# Patient Record
Sex: FEMALE | Race: WHITE | HISPANIC OR LATINO | Employment: OTHER | URBAN - METROPOLITAN AREA
[De-identification: names, ages, dates, MRNs, and addresses within clinical notes are randomized per-mention and may not be internally consistent; named-entity substitution may affect disease eponyms.]

---

## 2023-06-06 ENCOUNTER — APPOINTMENT (EMERGENCY)
Dept: CT IMAGING | Facility: HOSPITAL | Age: 38
DRG: 871 | End: 2023-06-06
Payer: MEDICARE

## 2023-06-06 ENCOUNTER — APPOINTMENT (EMERGENCY)
Dept: RADIOLOGY | Facility: HOSPITAL | Age: 38
DRG: 871 | End: 2023-06-06
Payer: MEDICARE

## 2023-06-06 ENCOUNTER — HOSPITAL ENCOUNTER (INPATIENT)
Facility: HOSPITAL | Age: 38
LOS: 2 days | Discharge: HOME | DRG: 871 | End: 2023-06-08
Attending: EMERGENCY MEDICINE | Admitting: INTERNAL MEDICINE
Payer: MEDICARE

## 2023-06-06 DIAGNOSIS — J18.9 PNEUMONIA: ICD-10-CM

## 2023-06-06 DIAGNOSIS — J96.01 ACUTE HYPOXEMIC RESPIRATORY FAILURE (HCC): Primary | ICD-10-CM

## 2023-06-06 DIAGNOSIS — J18.9 PNEUMONITIS: ICD-10-CM

## 2023-06-06 DIAGNOSIS — Z94.84 H/O STEM CELL TRANSPLANT (HCC): ICD-10-CM

## 2023-06-06 DIAGNOSIS — D84.9 IMMUNOSUPPRESSION (HCC): ICD-10-CM

## 2023-06-06 LAB
ALBUMIN SERPL BCP-MCNC: 4.1 G/DL (ref 3.5–5)
ALP SERPL-CCNC: 71 U/L (ref 34–104)
ALT SERPL W P-5'-P-CCNC: 26 U/L (ref 7–52)
ANION GAP SERPL CALCULATED.3IONS-SCNC: 9 MMOL/L (ref 4–13)
AST SERPL W P-5'-P-CCNC: 46 U/L (ref 13–39)
ATRIAL RATE: 113 BPM
BASE EX.OXY STD BLDV CALC-SCNC: 90.6 % (ref 60–80)
BASE EXCESS BLDV CALC-SCNC: 0.4 MMOL/L
BASOPHILS # BLD AUTO: 0.01 THOUSANDS/ÂΜL (ref 0–0.1)
BASOPHILS NFR BLD AUTO: 0 % (ref 0–1)
BILIRUB DIRECT SERPL-MCNC: 0.15 MG/DL (ref 0–0.2)
BILIRUB SERPL-MCNC: 0.34 MG/DL (ref 0.2–1)
BNP SERPL-MCNC: 116 PG/ML (ref 0–100)
BUN SERPL-MCNC: 21 MG/DL (ref 5–25)
CALCIUM SERPL-MCNC: 8.9 MG/DL (ref 8.4–10.2)
CARDIAC TROPONIN I PNL SERPL HS: 4 NG/L
CHLORIDE SERPL-SCNC: 104 MMOL/L (ref 96–108)
CO2 SERPL-SCNC: 24 MMOL/L (ref 21–32)
CREAT SERPL-MCNC: 0.76 MG/DL (ref 0.6–1.3)
D DIMER PPP FEU-MCNC: 1.79 UG/ML FEU
EOSINOPHIL # BLD AUTO: 0 THOUSAND/ÂΜL (ref 0–0.61)
EOSINOPHIL NFR BLD AUTO: 0 % (ref 0–6)
ERYTHROCYTE [DISTWIDTH] IN BLOOD BY AUTOMATED COUNT: 13.1 % (ref 11.6–15.1)
GFR SERPL CREATININE-BSD FRML MDRD: 99 ML/MIN/1.73SQ M
GLUCOSE SERPL-MCNC: 154 MG/DL (ref 65–140)
HCG SERPL QL: NEGATIVE
HCO3 BLDV-SCNC: 23.7 MMOL/L (ref 24–30)
HCT VFR BLD AUTO: 30.6 % (ref 34.8–46.1)
HGB BLD-MCNC: 10.4 G/DL (ref 11.5–15.4)
IMM GRANULOCYTES # BLD AUTO: 0.04 THOUSAND/UL (ref 0–0.2)
IMM GRANULOCYTES NFR BLD AUTO: 0 % (ref 0–2)
LACTATE SERPL-SCNC: 1.5 MMOL/L (ref 0.5–2)
LYMPHOCYTES # BLD AUTO: 0.49 THOUSANDS/ÂΜL (ref 0.6–4.47)
LYMPHOCYTES NFR BLD AUTO: 5 % (ref 14–44)
MCH RBC QN AUTO: 35.1 PG (ref 26.8–34.3)
MCHC RBC AUTO-ENTMCNC: 34 G/DL (ref 31.4–37.4)
MCV RBC AUTO: 103 FL (ref 82–98)
MONOCYTES # BLD AUTO: 0.48 THOUSAND/ÂΜL (ref 0.17–1.22)
MONOCYTES NFR BLD AUTO: 5 % (ref 4–12)
NEUTROPHILS # BLD AUTO: 9.14 THOUSANDS/ÂΜL (ref 1.85–7.62)
NEUTS SEG NFR BLD AUTO: 90 % (ref 43–75)
NRBC BLD AUTO-RTO: 0 /100 WBCS
O2 CT BLDV-SCNC: 14.9 ML/DL
P AXIS: 66 DEGREES
PCO2 BLDV: 33.6 MM HG (ref 42–50)
PH BLDV: 7.47 [PH] (ref 7.3–7.4)
PLATELET # BLD AUTO: 245 THOUSANDS/UL (ref 149–390)
PMV BLD AUTO: 9.9 FL (ref 8.9–12.7)
PO2 BLDV: 60.5 MM HG (ref 35–45)
POTASSIUM SERPL-SCNC: 4.1 MMOL/L (ref 3.5–5.3)
PR INTERVAL: 146 MS
PROCALCITONIN SERPL-MCNC: 0.58 NG/ML
PROT SERPL-MCNC: 8.3 G/DL (ref 6.4–8.4)
QRS AXIS: -39 DEGREES
QRSD INTERVAL: 88 MS
QT INTERVAL: 342 MS
QTC INTERVAL: 469 MS
RBC # BLD AUTO: 2.96 MILLION/UL (ref 3.81–5.12)
SARS-COV-2 RNA RESP QL NAA+PROBE: POSITIVE
SODIUM SERPL-SCNC: 137 MMOL/L (ref 135–147)
T WAVE AXIS: 60 DEGREES
VENTRICULAR RATE: 113 BPM
WBC # BLD AUTO: 10.16 THOUSAND/UL (ref 4.31–10.16)

## 2023-06-06 PROCEDURE — 94640 AIRWAY INHALATION TREATMENT: CPT

## 2023-06-06 PROCEDURE — 96365 THER/PROPH/DIAG IV INF INIT: CPT

## 2023-06-06 PROCEDURE — 84484 ASSAY OF TROPONIN QUANT: CPT | Performed by: EMERGENCY MEDICINE

## 2023-06-06 PROCEDURE — 83605 ASSAY OF LACTIC ACID: CPT | Performed by: EMERGENCY MEDICINE

## 2023-06-06 PROCEDURE — 85025 COMPLETE CBC W/AUTO DIFF WBC: CPT | Performed by: EMERGENCY MEDICINE

## 2023-06-06 PROCEDURE — 80076 HEPATIC FUNCTION PANEL: CPT | Performed by: EMERGENCY MEDICINE

## 2023-06-06 PROCEDURE — 94760 N-INVAS EAR/PLS OXIMETRY 1: CPT

## 2023-06-06 PROCEDURE — 84145 PROCALCITONIN (PCT): CPT | Performed by: EMERGENCY MEDICINE

## 2023-06-06 PROCEDURE — G1004 CDSM NDSC: HCPCS

## 2023-06-06 PROCEDURE — 84703 CHORIONIC GONADOTROPIN ASSAY: CPT | Performed by: PHYSICIAN ASSISTANT

## 2023-06-06 PROCEDURE — 83036 HEMOGLOBIN GLYCOSYLATED A1C: CPT | Performed by: PHYSICIAN ASSISTANT

## 2023-06-06 PROCEDURE — 93005 ELECTROCARDIOGRAM TRACING: CPT

## 2023-06-06 PROCEDURE — 99285 EMERGENCY DEPT VISIT HI MDM: CPT

## 2023-06-06 PROCEDURE — 87040 BLOOD CULTURE FOR BACTERIA: CPT | Performed by: EMERGENCY MEDICINE

## 2023-06-06 PROCEDURE — 96375 TX/PRO/DX INJ NEW DRUG ADDON: CPT

## 2023-06-06 PROCEDURE — 83880 ASSAY OF NATRIURETIC PEPTIDE: CPT | Performed by: EMERGENCY MEDICINE

## 2023-06-06 PROCEDURE — 87449 NOS EACH ORGANISM AG IA: CPT | Performed by: EMERGENCY MEDICINE

## 2023-06-06 PROCEDURE — 82805 BLOOD GASES W/O2 SATURATION: CPT | Performed by: EMERGENCY MEDICINE

## 2023-06-06 PROCEDURE — 93010 ELECTROCARDIOGRAM REPORT: CPT | Performed by: INTERNAL MEDICINE

## 2023-06-06 PROCEDURE — 87635 SARS-COV-2 COVID-19 AMP PRB: CPT | Performed by: PHYSICIAN ASSISTANT

## 2023-06-06 PROCEDURE — 80197 ASSAY OF TACROLIMUS: CPT | Performed by: EMERGENCY MEDICINE

## 2023-06-06 PROCEDURE — 85379 FIBRIN DEGRADATION QUANT: CPT | Performed by: EMERGENCY MEDICINE

## 2023-06-06 PROCEDURE — 71275 CT ANGIOGRAPHY CHEST: CPT

## 2023-06-06 PROCEDURE — 36415 COLL VENOUS BLD VENIPUNCTURE: CPT | Performed by: EMERGENCY MEDICINE

## 2023-06-06 PROCEDURE — 80048 BASIC METABOLIC PNL TOTAL CA: CPT | Performed by: EMERGENCY MEDICINE

## 2023-06-06 RX ORDER — CEFTRIAXONE 1 G/50ML
1000 INJECTION, SOLUTION INTRAVENOUS ONCE
Status: COMPLETED | OUTPATIENT
Start: 2023-06-06 | End: 2023-06-06

## 2023-06-06 RX ORDER — MAGNESIUM SULFATE HEPTAHYDRATE 40 MG/ML
2 INJECTION, SOLUTION INTRAVENOUS ONCE
Status: COMPLETED | OUTPATIENT
Start: 2023-06-06 | End: 2023-06-06

## 2023-06-06 RX ORDER — FLUTICASONE PROPIONATE AND SALMETEROL 250; 50 UG/1; UG/1
1 POWDER RESPIRATORY (INHALATION) 2 TIMES DAILY
COMMUNITY

## 2023-06-06 RX ORDER — TACROLIMUS 1 MG/1
1 CAPSULE ORAL EVERY OTHER DAY
COMMUNITY

## 2023-06-06 RX ORDER — ACYCLOVIR 800 MG/1
800 TABLET ORAL 2 TIMES DAILY
COMMUNITY

## 2023-06-06 RX ORDER — ALBUTEROL SULFATE 90 UG/1
2 AEROSOL, METERED RESPIRATORY (INHALATION) EVERY 6 HOURS PRN
COMMUNITY

## 2023-06-06 RX ORDER — METHYLPREDNISOLONE SODIUM SUCCINATE 125 MG/2ML
125 INJECTION, POWDER, LYOPHILIZED, FOR SOLUTION INTRAMUSCULAR; INTRAVENOUS ONCE
Status: COMPLETED | OUTPATIENT
Start: 2023-06-06 | End: 2023-06-06

## 2023-06-06 RX ORDER — SODIUM CHLORIDE FOR INHALATION 0.9 %
12 VIAL, NEBULIZER (ML) INHALATION ONCE
Status: COMPLETED | OUTPATIENT
Start: 2023-06-06 | End: 2023-06-06

## 2023-06-06 RX ORDER — ACETAMINOPHEN 325 MG/1
975 TABLET ORAL ONCE
Status: COMPLETED | OUTPATIENT
Start: 2023-06-06 | End: 2023-06-06

## 2023-06-06 RX ADMIN — IOHEXOL 100 ML: 350 INJECTION, SOLUTION INTRAVENOUS at 19:57

## 2023-06-06 RX ADMIN — ALBUTEROL SULFATE 10 MG: 2.5 SOLUTION RESPIRATORY (INHALATION) at 17:45

## 2023-06-06 RX ADMIN — METHYLPREDNISOLONE SODIUM SUCCINATE 125 MG: 125 INJECTION, POWDER, FOR SOLUTION INTRAMUSCULAR; INTRAVENOUS at 17:50

## 2023-06-06 RX ADMIN — VANCOMYCIN HYDROCHLORIDE 1250 MG: 5 INJECTION, POWDER, LYOPHILIZED, FOR SOLUTION INTRAVENOUS at 22:52

## 2023-06-06 RX ADMIN — Medication 12 ML: at 17:45

## 2023-06-06 RX ADMIN — ACETAMINOPHEN 975 MG: 325 TABLET, FILM COATED ORAL at 22:19

## 2023-06-06 RX ADMIN — IPRATROPIUM BROMIDE 1.5 MG: 0.5 SOLUTION RESPIRATORY (INHALATION) at 17:45

## 2023-06-06 RX ADMIN — MAGNESIUM SULFATE HEPTAHYDRATE 2 G: 40 INJECTION, SOLUTION INTRAVENOUS at 17:49

## 2023-06-06 RX ADMIN — CEFTRIAXONE 1000 MG: 1 INJECTION, SOLUTION INTRAVENOUS at 22:20

## 2023-06-06 NOTE — ED PROVIDER NOTES
History  Chief Complaint   Patient presents with   • Shortness of Breath     Patient sent in by urgent care for x-ray  Patient reports shortness of breath for a few days     HPI    None       Past Medical History:   Diagnosis Date   • Asthma    • Lymphoma ([facility] [address] )        History reviewed  No pertinent surgical history  History reviewed  No pertinent family history  I have reviewed and agree with the history as documented  E-Cigarette/Vaping   • E-Cigarette Use Never User      E-Cigarette/Vaping Substances     Social History     Tobacco Use   • Smoking status: Never   • Smokeless tobacco: Never   Vaping Use   • Vaping Use: Never used   Substance Use Topics   • Alcohol use: Not Currently   • Drug use: Not Currently       Review of Systems    Physical Exam  Physical Exam  Vitals and nursing note reviewed  Constitutional:       General: She is in acute distress  Appearance: She is well-developed  HENT:      Head: Normocephalic and atraumatic  Eyes:      Conjunctiva/sclera: Conjunctivae normal       Pupils: Pupils are equal, round, and reactive to light  Neck:      Trachea: No tracheal deviation  Cardiovascular:      Rate and Rhythm: Regular rhythm  Tachycardia present  Heart sounds: Normal heart sounds  Pulmonary:      Effort: Tachypnea and respiratory distress (moderate) present  Breath sounds: Decreased air movement (mild, diffuse) present  Wheezing present  Comments: Mild conversational dyspnea  Coughing with deep breaths  Low normal O2 sats- 91-93% on RA  Abdominal:      General: There is no distension  Palpations: Abdomen is soft  Tenderness: There is no abdominal tenderness  Musculoskeletal:      Cervical back: Normal range of motion  Right lower leg: No edema  Left lower leg: No edema  Skin:     General: Skin is warm and dry  Neurological:      Mental Status: She is alert and oriented to person, place, and time  GCS: GCS eye subscore is 4   GCS verbal subscore is 5  GCS motor subscore is 6  Psychiatric:         Behavior: Behavior normal          Vital Signs  ED Triage Vitals [06/06/23 1636]   Temperature Pulse Respirations Blood Pressure SpO2   98 8 °F (37 1 °C) (!) 123 (!) 26 137/66 93 %      Temp Source Heart Rate Source Patient Position - Orthostatic VS BP Location FiO2 (%)   Oral Monitor Sitting Left arm --      Pain Score       --           Vitals:    06/06/23 1636   BP: 137/66   Pulse: (!) 123   Patient Position - Orthostatic VS: Sitting         Visual Acuity      ED Medications  Medications   vancomycin (VANCOCIN) 1,250 mg in sodium chloride 0 9 % 250 mL IVPB (1,250 mg Intravenous New Bag 6/6/23 2252)   albuterol inhalation solution 10 mg (10 mg Nebulization Given 6/6/23 1745)   ipratropium (ATROVENT) 0 02 % inhalation solution 1 5 mg (1 5 mg Nebulization Given 6/6/23 1745)   sodium chloride 0 9 % inhalation solution 12 mL (12 mL Nebulization Given 6/6/23 1745)   methylPREDNISolone sodium succinate (Solu-MEDROL) injection 125 mg (125 mg Intravenous Given 6/6/23 1750)   magnesium sulfate 2 g/50 mL IVPB (premix) 2 g (0 g Intravenous Stopped 6/6/23 1919)   iohexol (OMNIPAQUE) 350 MG/ML injection (SINGLE-DOSE) 100 mL (100 mL Intravenous Given 6/6/23 1957)   cefTRIAXone (ROCEPHIN) IVPB (premix in dextrose) 1,000 mg 50 mL (0 mg Intravenous Stopped 6/6/23 2250)   acetaminophen (TYLENOL) tablet 975 mg (975 mg Oral Given 6/6/23 2219)       Diagnostic Studies  Results Reviewed     Procedure Component Value Units Date/Time    Hepatic function panel [[id number]]  (Abnormal) Collected: 06/06/23 1740    Lab Status: Final result Specimen: Blood from Arm, Left Updated: 06/06/23 2228     Total Bilirubin 0 34 mg/dL      Bilirubin, Direct 0 15 mg/dL      Alkaline Phosphatase 71 U/L      AST 46 U/L      ALT 26 U/L      Total Protein 8 3 g/dL      Albumin 4 1 g/dL     Tacrolimus level [[id number]] Collected: 06/06/23 2212    Lab Status:  In process Specimen: Blood from Arm, Right Updated: 06/06/23 2219    Blood culture #2 [[id number]] Collected: 06/06/23 2000    Lab Status: In process Specimen: Blood from Arm, Left Updated: 06/06/23 2219    Blood culture #1 [[id number]] Collected: 06/06/23 2212    Lab Status: In process Specimen: Blood from Arm, Right Updated: 06/06/23 2219    Legionella antigen, urine [[id number]]     Lab Status: No result Specimen: Urine     Strep Pneumoniae, Urine [[id number]]     Lab Status: No result Specimen: Urine     CBC and differential [[id number]]  (Abnormal) Collected: 06/06/23 1740    Lab Status: Final result Specimen: Blood from Arm, Left Updated: 06/06/23 1834     WBC 10 16 Thousand/uL      RBC 2 96 Million/uL      Hemoglobin 10 4 g/dL      Hematocrit 30 6 %       fL      MCH 35 1 pg      MCHC 34 0 g/dL      RDW 13 1 %      MPV 9 9 fL      Platelets 183 Thousands/uL      nRBC 0 /100 WBCs      Neutrophils Relative 90 %      Immat GRANS % 0 %      Lymphocytes Relative 5 %      Monocytes Relative 5 %      Eosinophils Relative 0 %      Basophils Relative 0 %      Neutrophils Absolute 9 14 Thousands/µL      Immature Grans Absolute 0 04 Thousand/uL      Lymphocytes Absolute 0 49 Thousands/µL      Monocytes Absolute 0 48 Thousand/µL      Eosinophils Absolute 0 00 Thousand/µL      Basophils Absolute 0 01 Thousands/µL     Narrative: This is an appended report  These results have been appended to a previously verified report      D-Dimer [[id number]]  (Abnormal) Collected: 06/06/23 1740    Lab Status: Final result Specimen: Blood from Arm, Left Updated: 06/06/23 1823     D-Dimer, Quant 1 79 ug/ml FEU     HS Troponin 0hr (reflex protocol) [[id number]]  (Normal) Collected: 06/06/23 1740    Lab Status: Final result Specimen: Blood from Arm, Left Updated: 06/06/23 1821     hs TnI 0hr 4 ng/L     Procalcitonin [[id number]]  (Abnormal) Collected: 06/06/23 1740    Lab Status: Final result Specimen: Blood from Arm, Left Updated: 06/06/23 1820     Procalcitonin 0 58 ng/ml     B-Type Natriuretic Peptide(BNP) [[id number]]  (Abnormal) Collected: 06/06/23 1740    Lab Status: Final result Specimen: Blood from Arm, Left Updated: 06/06/23 1818      pg/mL     Lactic acid, plasma (w/reflex if result > 2 0) [[id number]]  (Normal) Collected: 06/06/23 1740    Lab Status: Final result Specimen: Blood from Arm, Left Updated: 06/06/23 1808     LACTIC ACID 1 5 mmol/L     Narrative:      Result may be elevated if tourniquet was used during collection  Basic metabolic panel [[id number]]  (Abnormal) Collected: 06/06/23 1740    Lab Status: Final result Specimen: Blood from Arm, Left Updated: 06/06/23 1808     Sodium 137 mmol/L      Potassium 4 1 mmol/L      Chloride 104 mmol/L      CO2 24 mmol/L      ANION GAP 9 mmol/L      BUN 21 mg/dL      Creatinine 0 76 mg/dL      Glucose 154 mg/dL      Calcium 8 9 mg/dL      eGFR 99 ml/min/1 73sq m     Narrative:      [facility] guidelines for Chronic Kidney Disease (CKD):   •  Stage 1 with normal or high GFR (GFR > 90 mL/min/1 73 square meters)  •  Stage 2 Mild CKD (GFR = 60-89 mL/min/1 73 square meters)  •  Stage 3A Moderate CKD (GFR = 45-59 mL/min/1 73 square meters)  •  Stage 3B Moderate CKD (GFR = 30-44 mL/min/1 73 square meters)  •  Stage 4 Severe CKD (GFR = 15-29 mL/min/1 73 square meters)  •  Stage 5 End Stage CKD (GFR <15 mL/min/1 73 square meters)  Note: GFR calculation is accurate only with a steady state creatinine    Blood gas, venous [[id number]]  (Abnormal) Collected: 06/06/23 1740    Lab Status: Final result Specimen: Blood from Arm, Left Updated: 06/06/23 1751     pH, Saw 7 466     pCO2, Saw 33 6 mm Hg      pO2, Saw 60 5 mm Hg      HCO3, Saw 23 7 mmol/L      Base Excess, Saw 0 4 mmol/L      O2 Content, Saw 14 9 ml/dL      O2 HGB, VENOUS 90 6 %                  CTA ED chest PE study   Final Result by Vtialy Burks MD (06/06 2053)      Patchy infiltrates throughout the lungs        No pulmonary embolism or aortic dissection  No effusion or pneumothorax  Recommend follow-up to resolution  The study was marked in [facility] for immediate notification        Workstation performed: [id number]                    Procedures  ECG 12 Lead Documentation Only    Date/Time: 6/6/2023 5:31 PM    Performed by: Kay Palacios MD  Authorized by: Kay Palacios MD    Indications / Diagnosis:  SOB  ECG reviewed by me, the ED Provider: yes    Patient location:  ED  Previous ECG:     Previous ECG:  Unavailable  Interpretation:     Interpretation: non-specific    Rate:     ECG rate:  113    ECG rate assessment: tachycardic    Rhythm:     Rhythm: sinus tachycardia    Ectopy:     Ectopy: none    QRS:     QRS axis:  Left    QRS intervals:  Normal  Conduction:     Conduction: normal    ST segments:     ST segments:  Normal  T waves:     T waves: normal      CriticalCare Time    Date/Time: 6/6/2023 11:32 PM    Performed by: Kay Palacios MD  Authorized by: Kay Palacios MD    Critical care provider statement:     Critical care time (minutes):  46    Critical care time was exclusive of:  Separately billable procedures and treating other patients and teaching time    Critical care was necessary to treat or prevent imminent or life-threatening deterioration of the following conditions:  Respiratory failure, sepsis and circulatory failure    Critical care was time spent personally by me on the following activities:  Obtaining history from patient or surrogate, development of treatment plan with patient or surrogate, discussions with consultants, evaluation of patient's response to treatment, examination of patient, re-evaluation of patient's condition, ordering and performing treatments and interventions, ordering and review of laboratory studies and ordering and review of radiographic studies    I assumed direction of critical care for this patient from another provider in "my specialty: no               ED Course                                             Medical Decision Making  This is a 29-year-old female who presents here today for evaluation of cough and shortness of breath  She has a history of Hodgkin's lymphoma, status post both allogenic and autologous stem cell transplants, previously on \"immunosuppressive agents,\" which she said caused a chronic pneumonitis  She was on oxygen for about a year but symptoms improved  She is maintained on chronic daily medications and albuterol as needed  She says at baseline O2 sats usually run 94 to 96% at the lower end, but up to 98%  She said she was doing well up until about a week ago  She was starting to develop some cough, and saw her doctor in [facility], who ordered testing for COVID, flu, and \"mycoplasma,\" which all came back negative  She says last week they came up to [place] for vacation, and since 6/2 has been having worsening cough and shortness of breath, not improving with her albuterol  She says cold weather has triggered symptoms before, and says temperature is significantly colder here than it has been in [facility]   She went to urgent care yesterday, was told she needed an x-ray but was unable to get it done  O2 sats at that visit were 91%, with a temperature of 102  She says she has not felt like she has had a fever at home  She continues to have symptoms today, prompting her to come to the ER for evaluation  Review of systems, otherwise negative unless stated as above    She is well-appearing, no moderate respiratory distress  She has mild wheezing with mildly decreased breath sounds, and frequent cough with deep breaths  She is tachycardic to the 120s, with borderline hypoxia between 91 and 93%  She is tachypneic with mild conversational dyspnea  She has no lower extremity edema  Exam is otherwise unremarkable    This could be exacerbation of her underlying chronic lung disease given weather " changes, exacerbated the past 2 days, especially today, due to significant smoke in the air  However, with degree of tachycardia and hypoxia and recent travel from [facility] there is concern for possible PE contributing to this  She tested negative for viral causes about a week ago, however with fever yesterday may be component of bacterial pneumonia or other nonspecific viral illness  We will check chest x-ray and lab work to evaluate, and treat her symptoms  The patient has an elevated D-dimer of 1 79  BNP is minimally elevated at 116  She is mildly anemic to 10 4  D-dimer is elevated to 1 79  Procalcitonin is mildly elevated to 0 58, however she has a normal lactic acid and no leukocytosis  Given elevation of D-dimer, we will get CT PE study instead of chest x-ray  I did update patient on this plan, and she expresses understanding  The patient does endorse improvement after nebulizers, however has become hypoxic down to the high 80s on room air  She is less tachypneic, but still mildly so  She does still have tachycardia  She has mild diffuse wheezing but improved air movement and is no longer coughing with deep breaths  CT scan shows no PE  She has patchy infiltrates throughout the lungs  This could be related to acute bacterial pneumonia, atypical bacterial or viral pneumonia given immunosuppression  We will start her on broad-spectrum antibiotics  Given hypoxia she will be admitted for further evaluation management of her symptoms  I updated patient and  on findings and plan of care, and they expressed understanding  Acute hypoxemic respiratory failure ([facility] [address] ): acute illness or injury  H/O stem cell transplant [facility]): acute illness or injury  Immunosuppression [facility]): acute illness or injury  Pneumonia: acute illness or injury  Pneumonitis: acute illness or injury  Amount and/or Complexity of Data Reviewed  Labs: ordered  Radiology: ordered  Risk  OTC drugs    Prescription drug management  Decision regarding hospitalization  Disposition  Final diagnoses:   [facility]   Pneumonia   [facility]   Immunosuppression ([facility] )   Pneumonitis - chronic, due to antineoplastic/immunosuppressive agents     Time reflects when diagnosis was documented in both MDM as applicable and the Disposition within this note     Time User Action Codes Description Comment    6/6/2023  9:53 PM Akins-TesYoon agudelo Add [J96 01] Acute hypoxemic respiratory failure ([facility] )     6/6/2023  9:56 PM Akins-TesdaniieroYoon Faroese Add [J18 9] Pneumonia     6/6/2023  9:59 PM Akins-TesdaniieroYoon Add [Z94 84] H/O stem cell transplant ([facility] )     6/6/2023 10:16 PM Akins-TesdaniieroYoon Add [D84 9] Immunosuppression ([facility] )     6/6/2023 10:18 PM Mable-Yoon Cruz Add [J18 9] Pneumonitis     6/6/2023 10:18 PM AkinsYoon Jeffrey Modify [J18 9] Pneumonitis chronic, due to antineoplastic/immunosuppressive agents      ED Disposition     ED Disposition   Admit    Condition   Stable    Date/Time   Tue Jun 6, 2023 11:18 PM    Comment   Case was discussed with Carl Monroe and the patient's admission status was agreed to be Admission Status: inpatient status to the service of Dr Augie Bansal  Follow-up Information    None         Patient's Medications    No medications on file       No discharge procedures on file      PDMP Review     None          ED Provider  Electronically Signed by           Williams Espinoza MD  06/06/23 8217       Williams Espinoza MD  06/06/23 9617       Williams Espinoza MD  06/06/23 3977

## 2023-06-06 NOTE — Clinical Note
Case was discussed with Ariel Childers and the patient's admission status was agreed to be Admission Status: inpatient status to the service of

## 2023-06-07 PROBLEM — U07.1 ACUTE RESPIRATORY FAILURE DUE TO COVID-19 (HCC): Status: ACTIVE | Noted: 2023-06-07

## 2023-06-07 PROBLEM — J96.00 ACUTE RESPIRATORY FAILURE DUE TO COVID-19 (HCC): Status: ACTIVE | Noted: 2023-06-07

## 2023-06-07 PROBLEM — A41.9 SEPSIS (HCC): Status: ACTIVE | Noted: 2023-06-07

## 2023-06-07 PROBLEM — Z94.84 HISTORY OF STEM CELL TRANSPLANT (HCC): Chronic | Status: ACTIVE | Noted: 2023-06-07

## 2023-06-07 PROBLEM — J18.9 PNEUMONITIS: Chronic | Status: ACTIVE | Noted: 2023-06-07

## 2023-06-07 PROBLEM — C85.90 LYMPHOMA (HCC): Chronic | Status: ACTIVE | Noted: 2023-06-07

## 2023-06-07 LAB
CARDIAC TROPONIN I PNL SERPL HS: 7 NG/L (ref 8–18)
CK SERPL-CCNC: 515 U/L (ref 26–192)
CRP SERPL QL: 167.3 MG/L
ERYTHROCYTE [DISTWIDTH] IN BLOOD BY AUTOMATED COUNT: 13.3 % (ref 11.6–15.1)
EST. AVERAGE GLUCOSE BLD GHB EST-MCNC: 108 MG/DL
HBA1C MFR BLD: 5.4 %
HCT VFR BLD AUTO: 28.9 % (ref 34.8–46.1)
HEPARIN CF II AG PPP IA-MCNC: <0.1 IU/ML
HGB BLD-MCNC: 9.8 G/DL (ref 11.5–15.4)
L PNEUMO1 AG UR QL IA.RAPID: NEGATIVE
MCH RBC QN AUTO: 35.9 PG (ref 26.8–34.3)
MCHC RBC AUTO-ENTMCNC: 33.9 G/DL (ref 31.4–37.4)
MCV RBC AUTO: 106 FL (ref 82–98)
PLATELET # BLD AUTO: 237 THOUSANDS/UL (ref 149–390)
PMV BLD AUTO: 9.6 FL (ref 8.9–12.7)
RBC # BLD AUTO: 2.73 MILLION/UL (ref 3.81–5.12)
S PNEUM AG UR QL: NEGATIVE
TACROLIMUS BLD-MCNC: <1 NG/ML (ref 3–15)
WBC # BLD AUTO: 8.16 THOUSAND/UL (ref 4.31–10.16)

## 2023-06-07 PROCEDURE — 82550 ASSAY OF CK (CPK): CPT | Performed by: PHYSICIAN ASSISTANT

## 2023-06-07 PROCEDURE — 99223 1ST HOSP IP/OBS HIGH 75: CPT | Performed by: INTERNAL MEDICINE

## 2023-06-07 PROCEDURE — 84484 ASSAY OF TROPONIN QUANT: CPT | Performed by: PHYSICIAN ASSISTANT

## 2023-06-07 PROCEDURE — 85027 COMPLETE CBC AUTOMATED: CPT | Performed by: PHYSICIAN ASSISTANT

## 2023-06-07 PROCEDURE — 86140 C-REACTIVE PROTEIN: CPT | Performed by: PHYSICIAN ASSISTANT

## 2023-06-07 PROCEDURE — 36415 COLL VENOUS BLD VENIPUNCTURE: CPT | Performed by: PHYSICIAN ASSISTANT

## 2023-06-07 PROCEDURE — 85520 HEPARIN ASSAY: CPT | Performed by: PHYSICIAN ASSISTANT

## 2023-06-07 RX ORDER — ACYCLOVIR 800 MG/1
800 TABLET ORAL 2 TIMES DAILY
Status: DISCONTINUED | OUTPATIENT
Start: 2023-06-07 | End: 2023-06-08 | Stop reason: HOSPADM

## 2023-06-07 RX ORDER — ALBUTEROL SULFATE 90 UG/1
2 AEROSOL, METERED RESPIRATORY (INHALATION) EVERY 4 HOURS PRN
Status: DISCONTINUED | OUTPATIENT
Start: 2023-06-07 | End: 2023-06-08 | Stop reason: HOSPADM

## 2023-06-07 RX ORDER — PANTOT AC/GLYCERIN/PETROLATUM
CREAM (GRAM) TOPICAL 3 TIMES DAILY
COMMUNITY

## 2023-06-07 RX ORDER — TACROLIMUS 1 MG/1
1 CAPSULE ORAL EVERY OTHER DAY
Status: DISCONTINUED | OUTPATIENT
Start: 2023-06-08 | End: 2023-06-07

## 2023-06-07 RX ORDER — ESTRADIOL 1 MG/1
1 TABLET ORAL
COMMUNITY

## 2023-06-07 RX ORDER — MAGNESIUM HYDROXIDE/ALUMINUM HYDROXICE/SIMETHICONE 120; 1200; 1200 MG/30ML; MG/30ML; MG/30ML
30 SUSPENSION ORAL EVERY 6 HOURS PRN
Status: DISCONTINUED | OUTPATIENT
Start: 2023-06-07 | End: 2023-06-08 | Stop reason: HOSPADM

## 2023-06-07 RX ORDER — TEMAZEPAM 7.5 MG/1
7.5 CAPSULE ORAL
Status: DISCONTINUED | OUTPATIENT
Start: 2023-06-07 | End: 2023-06-08 | Stop reason: HOSPADM

## 2023-06-07 RX ORDER — SODIUM CHLORIDE 9 MG/ML
50 INJECTION, SOLUTION INTRAVENOUS CONTINUOUS
Status: DISPENSED | OUTPATIENT
Start: 2023-06-07 | End: 2023-06-07

## 2023-06-07 RX ORDER — BENZONATATE 100 MG/1
100 CAPSULE ORAL 3 TIMES DAILY PRN
COMMUNITY

## 2023-06-07 RX ORDER — ESTAZOLAM 1 MG
1 TABLET ORAL
COMMUNITY

## 2023-06-07 RX ORDER — ESTRADIOL 1 MG/1
1 TABLET ORAL
Status: DISCONTINUED | OUTPATIENT
Start: 2023-06-07 | End: 2023-06-08 | Stop reason: HOSPADM

## 2023-06-07 RX ORDER — TACROLIMUS 0.5 MG/1
1 CAPSULE ORAL EVERY OTHER DAY
Status: DISCONTINUED | OUTPATIENT
Start: 2023-06-07 | End: 2023-06-08 | Stop reason: HOSPADM

## 2023-06-07 RX ORDER — CEFTRIAXONE 1 G/50ML
1000 INJECTION, SOLUTION INTRAVENOUS EVERY 24 HOURS
Status: DISCONTINUED | OUTPATIENT
Start: 2023-06-07 | End: 2023-06-08 | Stop reason: HOSPADM

## 2023-06-07 RX ORDER — PANTOT AC/GLYCERIN/PETROLATUM
CREAM (GRAM) TOPICAL 3 TIMES DAILY
Status: DISCONTINUED | OUTPATIENT
Start: 2023-06-07 | End: 2023-06-08 | Stop reason: HOSPADM

## 2023-06-07 RX ORDER — ACETAMINOPHEN 325 MG/1
650 TABLET ORAL EVERY 6 HOURS PRN
Status: DISCONTINUED | OUTPATIENT
Start: 2023-06-07 | End: 2023-06-08 | Stop reason: HOSPADM

## 2023-06-07 RX ORDER — PROGESTERONE 100 MG/1
1 CAPSULE ORAL
COMMUNITY

## 2023-06-07 RX ORDER — DEXAMETHASONE SODIUM PHOSPHATE 10 MG/ML
6 INJECTION, SOLUTION INTRAMUSCULAR; INTRAVENOUS EVERY 24 HOURS
Status: DISCONTINUED | OUTPATIENT
Start: 2023-06-07 | End: 2023-06-08 | Stop reason: HOSPADM

## 2023-06-07 RX ORDER — ENOXAPARIN SODIUM 100 MG/ML
1 INJECTION SUBCUTANEOUS EVERY 12 HOURS SCHEDULED
Status: DISCONTINUED | OUTPATIENT
Start: 2023-06-07 | End: 2023-06-08 | Stop reason: HOSPADM

## 2023-06-07 RX ORDER — PROGESTERONE 100 MG/1
1 CAPSULE ORAL
Status: DISCONTINUED | OUTPATIENT
Start: 2023-06-07 | End: 2023-06-08 | Stop reason: HOSPADM

## 2023-06-07 RX ORDER — FLUTICASONE FUROATE AND VILANTEROL 200; 25 UG/1; UG/1
1 POWDER RESPIRATORY (INHALATION)
Status: DISCONTINUED | OUTPATIENT
Start: 2023-06-07 | End: 2023-06-08 | Stop reason: HOSPADM

## 2023-06-07 RX ADMIN — PROGESTERONE 1 CAPSULE: 100 CAPSULE ORAL at 22:12

## 2023-06-07 RX ADMIN — TACROLIMUS 1 MG: 1 CAPSULE ORAL at 11:20

## 2023-06-07 RX ADMIN — HYDROCORTISONE: 25 CREAM TOPICAL at 21:25

## 2023-06-07 RX ADMIN — ENOXAPARIN SODIUM 90 MG: 100 INJECTION SUBCUTANEOUS at 21:24

## 2023-06-07 RX ADMIN — ENOXAPARIN SODIUM 90 MG: 100 INJECTION SUBCUTANEOUS at 09:41

## 2023-06-07 RX ADMIN — TEMAZEPAM 7.5 MG: 7.5 CAPSULE ORAL at 02:11

## 2023-06-07 RX ADMIN — ACYCLOVIR 800 MG: 800 TABLET ORAL at 09:49

## 2023-06-07 RX ADMIN — CEFTRIAXONE 1000 MG: 1 INJECTION, SOLUTION INTRAVENOUS at 21:25

## 2023-06-07 RX ADMIN — Medication: at 21:25

## 2023-06-07 RX ADMIN — ESTRADIOL 1 MG: 1 TABLET ORAL at 22:12

## 2023-06-07 RX ADMIN — ACYCLOVIR 800 MG: 800 TABLET ORAL at 17:49

## 2023-06-07 RX ADMIN — REMDESIVIR 200 MG: 100 INJECTION, POWDER, LYOPHILIZED, FOR SOLUTION INTRAVENOUS at 02:15

## 2023-06-07 RX ADMIN — SODIUM CHLORIDE 50 ML/HR: 0.9 INJECTION, SOLUTION INTRAVENOUS at 02:45

## 2023-06-07 RX ADMIN — DEXAMETHASONE SODIUM PHOSPHATE 6 MG: 10 INJECTION, SOLUTION INTRAMUSCULAR; INTRAVENOUS at 09:42

## 2023-06-07 RX ADMIN — FLUTICASONE FUROATE AND VILANTEROL TRIFENATATE 1 PUFF: 200; 25 POWDER RESPIRATORY (INHALATION) at 11:20

## 2023-06-07 NOTE — ASSESSMENT & PLAN NOTE
· Meeting criteria due to being febrile, tachycardic, tachypneic in setting of COVID-19 pneumonia  · Blood culture x2 pending, lactic acid WNL  · We will continue gentle IV fluid hydration for now over the next 12 hours  · Due to immunosuppressed status will continue IV ceftriaxone 1 g daily for now

## 2023-06-07 NOTE — ED NOTES
Patient ambulatory to bathroom to get washed up with RN   Patient ambulated with steady gait and no SOB     Gregory Jordan RN  06/07/23 4052

## 2023-06-07 NOTE — ASSESSMENT & PLAN NOTE
· Follows outpatient with oncology in [facility] where patient is from  · Continue home tacrolimus 1 mg every other day  · Patient requesting for oncologist to be updated, number in chart

## 2023-06-07 NOTE — ED NOTES
This nurse Remy texted the provider asking to change the order for the patients Prograf to today  Pt reports that she took it on the 5th and it is needed every other day         Maciej Kay RN  06/07/23 7923

## 2023-06-07 NOTE — H&P
[address] [place] [address]  H&P  Name: Chio Richter 45 y o  female I MRN: [id number]  Unit/Bed#: ED 27 I Date of Admission: 6/6/2023   Date of Service: 6/7/2023 I Hospital Day: 1      Assessment/Plan   * Acute respiratory failure due to COVID-19 [facility])  Assessment & Plan  · Presenting in 80% SPO2 on room air, now currently saturating 94 to 96% on 3 L nasal cannula, wean as tolerated  · COVID test positive  · CTA chest negative for PE but revealing patchy infiltrate throughout lungs most likely related to COVID-pneumonia  · We will place on mild COVID protocol  · Start IV dexamethasone 6 mg daily  · Start IV remdesivir daily  · D-dimer elevated 1 79, will start subcutaneous Lovenox 1 mg/kg every 12 hours    Sepsis ([facility] )  Assessment & Plan  · Meeting criteria due to being febrile, tachycardic, tachypneic in setting of COVID-19 pneumonia  · Blood culture x2 pending, lactic acid WNL  · We will continue gentle IV fluid hydration for now over the next 12 hours  · Due to immunosuppressed status will continue IV ceftriaxone 1 g daily for now    Lymphoma [facility])  Assessment & Plan  · [address] [facility] outpatient with oncology in [facility] where patient is from  · Continue home tacrolimus 1 mg every other day  · Patient requesting for oncologist to be updated, number in chart         VTE Pharmacologic Prophylaxis: VTE Score: 5 High Risk (Score >/= 5) - Pharmacological DVT Prophylaxis Ordered: enoxaparin (Lovenox)  Sequential Compression Devices Ordered  Code Status: Level 1 - Full Code   Discussion with family: pt  Anticipated Length of Stay: Patient will be admitted on an inpatient basis with an anticipated length of stay of greater than 2 midnights secondary to see above      Total Time Spent on Date of Encounter in care of patient: 75 minutes This time was spent on one or more of the following: performing physical exam; counseling and coordination of care; obtaining or reviewing history; documenting in the medical record; reviewing/ordering tests, medications or procedures; communicating with other healthcare professionals and discussing with patient's family/caregivers  Chief Complaint:    Chief Complaint   Patient presents with   • Shortness of Breath     Patient sent in by urgent care for x-ray  Patient reports shortness of breath for a few days       History of Present Illness:  Christiana Boland is a 45 y o  female with a PMH of lymphoma who presents with complaint of sudden onset shortness of breath and generally not feeling well about 2 days ago  Patient reports she is currently on vacation here from [facility] and reports prior to coming here she also did not feel well and had a fever, but went to her oncologist and doctor who reported that she was okay to come here on vacation  She reports her shortness of breath is much worse today but currently is subsiding with the help of supplemental oxygen  She admits she has been having fevers  Denies chest pain, abdominal pain     Patient reports she is fully vaccinated against COVID and had her initial 2 vaccines plus a booster  Review of Systems:  Review of Systems   Constitutional: Positive for fatigue and fever  Negative for activity change  Respiratory: Positive for shortness of breath  Cardiovascular: Negative for chest pain  Gastrointestinal: Negative for abdominal pain  Neurological: Negative for headaches  Past Medical and Surgical History:   Past Medical History:   Diagnosis Date   • Asthma    • Lymphoma ([facility] )        History reviewed  No pertinent surgical history  Meds/Allergies:  Prior to Admission medications    Medication Sig Start Date End Date Taking?  Authorizing Provider   albuterol (PROVENTIL HFA,VENTOLIN HFA) 90 mcg/act inhaler Inhale 2 puffs every 6 (six) hours as needed for wheezing   Yes Historical Provider, MD   estazolam (PROSOM) 1 MG tablet Take 1 mg by mouth daily at bedtime   Yes Historical Provider, MD "  Fluticasone-Salmeterol (Advair) 250-50 mcg/dose inhaler Inhale 1 puff 2 (two) times a day Rinse mouth after use  Yes Historical Provider, MD   tacrolimus (PROGRAF) 1 mg capsule Take 1 mg by mouth every other day   Yes Historical Provider, MD   acyclovir (ZOVIRAX) 800 mg tablet Take 800 mg by mouth 2 (two) times a day    Historical Provider, MD     I have reviewed her medications per review of chart and PDMP  Allergies: Allergies   Allergen Reactions   • Sulfa Antibiotics Rash       Social History:  Marital Status: /Civil Union     Patient Pre-hospital Living Situation: Home  Patient Pre-hospital Level of Mobility: walks  Patient Pre-hospital Diet Restrictions: n/a  Substance Use History:   Social History     Substance and Sexual Activity   Alcohol Use Not Currently     Social History     Tobacco Use   Smoking Status Never   Smokeless Tobacco Never     Social History     Substance and Sexual Activity   Drug Use Not Currently       Family History:  History reviewed  No pertinent family history  Physical Exam:     Vitals:   Blood Pressure: 117/73 (06/06/23 2329)  Pulse: (!) 113 (06/06/23 2329)  Temperature: 98 4 °F (36 9 °C) (06/06/23 2316)  Temp Source: Oral (06/06/23 2316)  Respirations: 20 (06/06/23 2329)  Height: 5' 5\" (165 1 cm) (06/06/23 2215)  Weight - Scale: 87 1 kg (192 lb) (06/06/23 2215)  SpO2: 95 % (06/06/23 2329)    Physical Exam  Vitals and nursing note reviewed  Constitutional:       General: She is not in acute distress  Appearance: Normal appearance  She is not diaphoretic  HENT:      Head: Normocephalic  Cardiovascular:      Rate and Rhythm: Regular rhythm  Tachycardia present  Heart sounds: Normal heart sounds  Pulmonary:      Effort: Pulmonary effort is normal  No respiratory distress  Breath sounds: Normal breath sounds  No stridor  No wheezing, rhonchi or rales  Abdominal:      General: Abdomen is flat   Bowel sounds are normal       Palpations: Abdomen " is soft  Musculoskeletal:         General: No tenderness  Right lower leg: No edema  Left lower leg: No edema  Skin:     General: Skin is warm and dry  Neurological:      General: No focal deficit present  Mental Status: She is alert and oriented to person, place, and time  Psychiatric:         Mood and Affect: Mood normal          Behavior: Behavior normal          Thought Content: Thought content normal           Additional Data:     Lab Results:  Results from last 7 days   Lab Units 06/06/23  1740   EOS PCT % 0   HEMATOCRIT % 30 6*   HEMOGLOBIN g/dL 10 4*   LYMPHS PCT % 5*   MONOS PCT % 5   NEUTROS PCT % 90*   PLATELETS Thousands/uL 245   WBC Thousand/uL 10 16     Results from last 7 days   Lab Units 06/06/23  1740   ANION GAP mmol/L 9   ALBUMIN g/dL 4 1   ALK PHOS U/L 71   ALT U/L 26   AST U/L 46*   BUN mg/dL 21   CALCIUM mg/dL 8 9   CHLORIDE mmol/L 104   CO2 mmol/L 24   CREATININE mg/dL 0 76   GLUCOSE RANDOM mg/dL 154*   POTASSIUM mmol/L 4 1   SODIUM mmol/L 137   TOTAL BILIRUBIN mg/dL 0 34                 Results from last 7 days   Lab Units 06/06/23  1740   LACTIC ACID mmol/L 1 5   PROCALCITONIN ng/ml 0 58*       Lines/Drains:  Invasive Devices     Peripheral Intravenous Line  Duration           Peripheral IV 06/06/23 Left Antecubital <1 day                    Imaging: Reviewed radiology reports from this admission including: chest CT scan  CTA ED chest PE study   Final Result by Cole Vela MD (06/06 2053)      Patchy infiltrates throughout the lungs  No pulmonary embolism or aortic dissection  No effusion or pneumothorax  Recommend follow-up to resolution  The study was marked in [facility] for immediate notification  Workstation performed: [id number]             EKG and Other Studies Reviewed on Admission:   · EKG: Sinus tachycardia  ** Please Note: This note has been constructed using a voice recognition system   **

## 2023-06-07 NOTE — ASSESSMENT & PLAN NOTE
· Presenting in 80% SPO2 on room air, now currently saturating 94 to 96% on 3 L nasal cannula, wean as tolerated  · COVID test positive  · CTA chest negative for PE but revealing patchy infiltrate throughout lungs most likely related to COVID-pneumonia  · We will place on mild COVID protocol  · Start IV dexamethasone 6 mg daily  · Start IV remdesivir daily  · D-dimer elevated 1 79, will start subcutaneous Lovenox 1 mg/kg every 12 hours

## 2023-06-07 NOTE — ED NOTES
SLIM provider at bedside, per slim ok to trial patient off of O2   Patient stated to provider they felt good after washing up and denied any sob afterwards     Rosy Walters, RN  06/07/23 3491

## 2023-06-08 VITALS
HEART RATE: 92 BPM | RESPIRATION RATE: 17 BRPM | WEIGHT: 192 LBS | OXYGEN SATURATION: 93 % | HEIGHT: 65 IN | TEMPERATURE: 98 F | BODY MASS INDEX: 31.99 KG/M2 | SYSTOLIC BLOOD PRESSURE: 130 MMHG | DIASTOLIC BLOOD PRESSURE: 85 MMHG

## 2023-06-08 LAB
ALBUMIN SERPL BCP-MCNC: 3.6 G/DL (ref 3.5–5)
ALP SERPL-CCNC: 59 U/L (ref 34–104)
ALT SERPL W P-5'-P-CCNC: 20 U/L (ref 7–52)
ANION GAP SERPL CALCULATED.3IONS-SCNC: 7 MMOL/L (ref 4–13)
AST SERPL W P-5'-P-CCNC: 26 U/L (ref 13–39)
BASOPHILS # BLD AUTO: 0.01 THOUSANDS/ÂΜL (ref 0–0.1)
BASOPHILS NFR BLD AUTO: 0 % (ref 0–1)
BILIRUB SERPL-MCNC: 0.5 MG/DL (ref 0.2–1)
BUN SERPL-MCNC: 18 MG/DL (ref 5–25)
CALCIUM SERPL-MCNC: 8.5 MG/DL (ref 8.4–10.2)
CHLORIDE SERPL-SCNC: 106 MMOL/L (ref 96–108)
CO2 SERPL-SCNC: 25 MMOL/L (ref 21–32)
CREAT SERPL-MCNC: 0.58 MG/DL (ref 0.6–1.3)
EOSINOPHIL # BLD AUTO: 0 THOUSAND/ÂΜL (ref 0–0.61)
EOSINOPHIL NFR BLD AUTO: 0 % (ref 0–6)
ERYTHROCYTE [DISTWIDTH] IN BLOOD BY AUTOMATED COUNT: 13.4 % (ref 11.6–15.1)
GFR SERPL CREATININE-BSD FRML MDRD: 117 ML/MIN/1.73SQ M
GLUCOSE SERPL-MCNC: 91 MG/DL (ref 65–140)
HCT VFR BLD AUTO: 29.7 % (ref 34.8–46.1)
HGB BLD-MCNC: 9.9 G/DL (ref 11.5–15.4)
IMM GRANULOCYTES # BLD AUTO: 0.04 THOUSAND/UL (ref 0–0.2)
IMM GRANULOCYTES NFR BLD AUTO: 1 % (ref 0–2)
LYMPHOCYTES # BLD AUTO: 1.74 THOUSANDS/ÂΜL (ref 0.6–4.47)
LYMPHOCYTES NFR BLD AUTO: 23 % (ref 14–44)
MCH RBC QN AUTO: 35 PG (ref 26.8–34.3)
MCHC RBC AUTO-ENTMCNC: 33.3 G/DL (ref 31.4–37.4)
MCV RBC AUTO: 105 FL (ref 82–98)
MONOCYTES # BLD AUTO: 0.66 THOUSAND/ÂΜL (ref 0.17–1.22)
MONOCYTES NFR BLD AUTO: 9 % (ref 4–12)
NEUTROPHILS # BLD AUTO: 5.21 THOUSANDS/ÂΜL (ref 1.85–7.62)
NEUTS SEG NFR BLD AUTO: 67 % (ref 43–75)
NRBC BLD AUTO-RTO: 0 /100 WBCS
PLATELET # BLD AUTO: 250 THOUSANDS/UL (ref 149–390)
PMV BLD AUTO: 9.8 FL (ref 8.9–12.7)
POTASSIUM SERPL-SCNC: 4.2 MMOL/L (ref 3.5–5.3)
PROT SERPL-MCNC: 7.4 G/DL (ref 6.4–8.4)
RBC # BLD AUTO: 2.83 MILLION/UL (ref 3.81–5.12)
SODIUM SERPL-SCNC: 138 MMOL/L (ref 135–147)
WBC # BLD AUTO: 7.66 THOUSAND/UL (ref 4.31–10.16)

## 2023-06-08 PROCEDURE — 80053 COMPREHEN METABOLIC PANEL: CPT | Performed by: INTERNAL MEDICINE

## 2023-06-08 PROCEDURE — 85025 COMPLETE CBC W/AUTO DIFF WBC: CPT | Performed by: INTERNAL MEDICINE

## 2023-06-08 PROCEDURE — XW033E5 INTRODUCTION OF REMDESIVIR ANTI-INFECTIVE INTO PERIPHERAL VEIN, PERCUTANEOUS APPROACH, NEW TECHNOLOGY GROUP 5: ICD-10-PCS | Performed by: INTERNAL MEDICINE

## 2023-06-08 PROCEDURE — 99239 HOSP IP/OBS DSCHRG MGMT >30: CPT | Performed by: INTERNAL MEDICINE

## 2023-06-08 RX ORDER — AMOXICILLIN AND CLAVULANATE POTASSIUM 875; 125 MG/1; MG/1
1 TABLET, FILM COATED ORAL EVERY 12 HOURS SCHEDULED
Qty: 6 TABLET | Refills: 0 | Status: SHIPPED | OUTPATIENT
Start: 2023-06-08 | End: 2023-06-11

## 2023-06-08 RX ADMIN — REMDESIVIR 100 MG: 100 INJECTION, POWDER, LYOPHILIZED, FOR SOLUTION INTRAVENOUS at 02:35

## 2023-06-08 RX ADMIN — ENOXAPARIN SODIUM 90 MG: 100 INJECTION SUBCUTANEOUS at 09:42

## 2023-06-08 RX ADMIN — ACYCLOVIR 800 MG: 800 TABLET ORAL at 09:53

## 2023-06-08 RX ADMIN — FLUTICASONE FUROATE AND VILANTEROL TRIFENATATE 1 PUFF: 200; 25 POWDER RESPIRATORY (INHALATION) at 09:42

## 2023-06-08 RX ADMIN — DEXAMETHASONE SODIUM PHOSPHATE 6 MG: 10 INJECTION, SOLUTION INTRAMUSCULAR; INTRAVENOUS at 09:42

## 2023-06-08 NOTE — DISCHARGE SUMMARY
[facility]  Discharge- Magui Norman 1985, 45 y o  female MRN: [id number]  Unit/Bed#: -01 Encounter: [id number]  Primary Care Provider: No primary care provider on file  Date and time admitted to hospital: 6/6/2023  5:14 PM    * Acute respiratory failure due to COVID-19 [facility])  Assessment & Plan  · Patient ambulating through her room and saturating greater than 90% on room air  Patient has been off oxygen for the last 24 hours  · COVID test positive  · CTA chest negative for PE but revealing patchy infiltrate throughout lungs most likely related to COVID-pneumonia  · Patient with prior doses of prednisone which she will continue on discharge  · Augmentin to treat underlying pneumonia for total of 5 days  · Patient received 2 days of IV remdesivir  · Patient to follow-up with primary care provider in [facility]; discussed with patient she should have repeat imaging of chest in 4 to 6 weeks    [facility] ([facility] )  Assessment & Plan  · Meeting criteria due to being febrile, tachycardic, tachypneic in setting of COVID-19 pneumonia  · Blood culture x2 no growth after 24 hours    Lymphoma ([facility] )  Assessment & Plan  · [address] outpatient with oncology in [facility] where patient is from  · Continue home tacrolimus 1 mg every other day  · Continue outpatient follow-up with primary oncologist in [address] [place] [address] Problems     Resolved Problems  Date Reviewed: 6/7/2023   None       Discharging Physician / Practitioner: Author Benja DO  PCP: No primary care provider on file    Admission Date:   Admission Orders (From admission, onward)     Ordered        06/06/23 2218  [facility] [place]  Once                      Discharge Date: 06/08/23    Consultations During Hospital Stay:  · none    Reason for Admission: Shortness of breath    Hospital Course:   Magui Norman is a 45 y o  female patient who originally presented to the hospital on 6/6/2023 due to shortness "of breath  Patient noted to have COVID-pneumonia and was treated with IV remdesivir and also oral antibiotics given superimposed pneumonia  Patient will be discharged on oral antibiotics to treat pneumonia and continue prior prednisone course for COVID  Patient is stable for discharge at this time and did discuss plan with patient and   Patient should also have repeat imaging in 4 to 6 weeks with primary care provider in [facility]     Please see above list of diagnoses and related plan for additional information  Condition at Discharge: stable    Discharge Day Visit / Exam:   Subjective: Patient resting comfortably on examination  Patient had no overnight events or complaints on exam   Patient was eager to go home today  Vitals: Blood Pressure: 130/85 (06/08/23 0010)  Pulse: 92 (06/08/23 0010)  Temperature: 98 °F (36 7 °C) (06/08/23 0010)  Temp Source: Oral (06/07/23 1703)  Respirations: 17 (06/07/23 1703)  Height: 5' 5\" (165 1 cm) (06/06/23 2215)  Weight - Scale: 87 1 kg (192 lb) (06/06/23 2215)  SpO2: 93 % (06/08/23 0010)  Exam:   Physical Exam  Vitals and nursing note reviewed  Constitutional:       General: She is not in acute distress  Appearance: She is well-developed  HENT:      Head: Normocephalic and atraumatic  Eyes:      General: No scleral icterus  Conjunctiva/sclera: Conjunctivae normal    Cardiovascular:      Rate and Rhythm: Normal rate and regular rhythm  Heart sounds: Normal heart sounds  No murmur heard  No friction rub  No gallop  Pulmonary:      Effort: Pulmonary effort is normal  No respiratory distress  Breath sounds: Normal breath sounds  No wheezing or rales  Abdominal:      General: Bowel sounds are normal  There is no distension  Palpations: Abdomen is soft  Tenderness: There is no abdominal tenderness  Musculoskeletal:         General: Normal range of motion  Skin:     General: Skin is warm  Findings: No rash   " Neurological:      Mental Status: She is alert and oriented to person, place, and time  Discussion with Family: Updated  () via phone  Discharge instructions/Information to patient and family:   See after visit summary for information provided to patient and family  Provisions for Follow-Up Care:  See after visit summary for information related to follow-up care and any pertinent home health orders  Disposition:   Home    Planned Readmission: none     Discharge Statement:  I spent 40 minutes discharging the patient  This time was spent on the day of discharge  I had direct contact with the patient on the day of discharge  Greater than 50% of the total time was spent examining patient, answering all patient questions, arranging and discussing plan of care with patient as well as directly providing post-discharge instructions  Additional time then spent on discharge activities  Discharge Medications:  See after visit summary for reconciled discharge medications provided to patient and/or family        **Please Note: This note may have been constructed using a voice recognition system**

## 2023-06-08 NOTE — ASSESSMENT & PLAN NOTE
· Patient ambulating through her room and saturating greater than 90% on room air    Patient has been off oxygen for the last 24 hours  · COVID test positive  · CTA chest negative for PE but revealing patchy infiltrate throughout lungs most likely related to COVID-pneumonia  · Patient with prior doses of prednisone which she will continue on discharge  · Augmentin to treat underlying pneumonia for total of 5 days  · Patient received 2 days of IV remdesivir  · Patient to follow-up with primary care provider in [facility]; discussed with patient she should have repeat imaging of chest in 4 to 6 weeks

## 2023-06-08 NOTE — ASSESSMENT & PLAN NOTE
· Meeting criteria due to being febrile, tachycardic, tachypneic in setting of COVID-19 pneumonia  · Blood culture x2 no growth after 24 hours

## 2023-06-08 NOTE — DISCHARGE INSTR - AVS FIRST PAGE
Follow-up with primary care provider  Recommend repeat CT imaging in 4 to 6 weeks for resolution with primary care provider

## 2023-06-08 NOTE — PLAN OF CARE
Problem: INFECTION - ADULT  Goal: Absence or prevention of progression during hospitalization  Description: INTERVENTIONS:  - Assess and monitor for signs and symptoms of infection  - Monitor lab/diagnostic results  - Monitor all insertion sites, i e  indwelling lines, tubes, and drains  - Monitor endotracheal if appropriate and nasal secretions for changes in amount and color  - [place] appropriate cooling/warming therapies per order  - Administer medications as ordered  - Instruct and encourage patient and family to use good hand hygiene technique  - Identify and instruct in appropriate isolation precautions for identified infection/condition  Outcome: Progressing     Problem: INFECTION - ADULT  Goal: Absence of fever/infection during neutropenic period  Description: INTERVENTIONS:  - Monitor WBC    Outcome: Progressing     Problem: RESPIRATORY - ADULT  Goal: Achieves optimal ventilation and oxygenation  Description: INTERVENTIONS:  - Assess for changes in respiratory status  - Assess for changes in mentation and behavior  - Position to facilitate oxygenation and minimize respiratory effort  - Oxygen administered by appropriate delivery if ordered  - Initiate smoking cessation education as indicated  - Encourage broncho-pulmonary hygiene including cough, deep breathe, Incentive Spirometry  - Assess the need for suctioning and aspirate as needed  - Assess and instruct to report SOB or any respiratory difficulty  - Respiratory Therapy support as indicated  Outcome: Progressing

## 2023-06-08 NOTE — PLAN OF CARE
Problem: Potential for Falls  Goal: Patient will remain free of falls  Description: INTERVENTIONS:  - Educate patient/family on patient safety including physical limitations  - Instruct patient to call for assistance with activity   - Consult OT/PT to assist with strengthening/mobility   - Keep Call bell within reach  - Keep bed low and locked with side rails adjusted as appropriate  - Keep care items and personal belongings within reach  - Initiate and maintain comfort rounds  - Make Fall Risk Sign visible to staff  - Offer Toileting every 2 Hours, in advance of need  - Initiate/Maintain bed alarm  - Obtain necessary fall risk management equipment  - Apply yellow socks and bracelet for high fall risk patients  - Consider moving patient to room near nurses station  Outcome: Progressing     Problem: INFECTION - ADULT  Goal: Absence or prevention of progression during hospitalization  Description: INTERVENTIONS:  - Assess and monitor for signs and symptoms of infection  - Monitor lab/diagnostic results  - Monitor all insertion sites, i e  indwelling lines, tubes, and drains  - Monitor endotracheal if appropriate and nasal secretions for changes in amount and color  - [place] appropriate cooling/warming therapies per order  - Administer medications as ordered  - Instruct and encourage patient and family to use good hand hygiene technique  - Identify and instruct in appropriate isolation precautions for identified infection/condition  Outcome: Progressing  Goal: Absence of fever/infection during neutropenic period  Description: INTERVENTIONS:  - Monitor WBC    Outcome: Progressing     Problem: SAFETY ADULT  Goal: Patient will remain free of falls  Description: INTERVENTIONS:  - Educate patient/family on patient safety including physical limitations  - Instruct patient to call for assistance with activity   - Consult OT/PT to assist with strengthening/mobility   - Keep Call bell within reach  - Keep bed low and locked with side rails adjusted as appropriate  - Keep care items and personal belongings within reach  - Initiate and maintain comfort rounds  - Make Fall Risk Sign visible to staff  - Offer Toileting every 2 Hours, in advance of need  - Initiate/Maintain bed alarm  - Obtain necessary fall risk management equipment  - Apply yellow socks and bracelet for high fall risk patients  - Consider moving patient to room near nurses station  Outcome: Progressing  Goal: Maintain or return to baseline ADL function  Description: INTERVENTIONS:  -  Assess patient's ability to carry out ADLs; assess patient's baseline for ADL function and identify physical deficits which impact ability to perform ADLs (bathing, care of mouth/teeth, toileting, grooming, dressing, etc )  - Assess/evaluate cause of self-care deficits   - Assess range of motion  - Assess patient's mobility; develop plan if impaired  - Assess patient's need for assistive devices and provide as appropriate  - Encourage maximum independence but intervene and supervise when necessary  - Involve family in performance of ADLs  - Assess for home care needs following discharge   - Consider OT consult to assist with ADL evaluation and planning for discharge  - Provide patient education as appropriate  Outcome: Progressing  Goal: Maintains/Returns to pre admission functional level  Description: INTERVENTIONS:  - Perform BMAT or MOVE assessment daily    - Set and communicate daily mobility goal to care team and patient/family/caregiver  - Collaborate with rehabilitation services on mobility goals if consulted  - Perform Range of Motion 3 times a day  - Reposition patient every 2 hours    - Dangle patient 3 times a day  - Stand patient 3 times a day  - Ambulate patient 3 times a day  - Out of bed to chair 3 times a day   - Out of bed for meals 3 times a day  - Out of bed for toileting  - Record patient progress and toleration of activity level   Outcome: Progressing Problem: DISCHARGE PLANNING  Goal: Discharge to home or other facility with appropriate resources  Description: INTERVENTIONS:  - Identify barriers to discharge w/patient and caregiver  - Arrange for needed discharge resources and transportation as appropriate  - Identify discharge learning needs (meds, wound care, etc )  - Arrange for interpretive services to assist at discharge as needed  - Refer to Case Management Department for coordinating discharge planning if the patient needs post-hospital services based on physician/advanced practitioner order or complex needs related to functional status, cognitive ability, or social support system  Outcome: Progressing     Problem: Knowledge Deficit  Goal: Patient/family/caregiver demonstrates understanding of disease process, treatment plan, medications, and discharge instructions  Description: Complete learning assessment and assess knowledge base    Interventions:  - Provide teaching at level of understanding  - Provide teaching via preferred learning methods  Outcome: Progressing     Problem: RESPIRATORY - ADULT  Goal: Achieves optimal ventilation and oxygenation  Description: INTERVENTIONS:  - Assess for changes in respiratory status  - Assess for changes in mentation and behavior  - Position to facilitate oxygenation and minimize respiratory effort  - Oxygen administered by appropriate delivery if ordered  - Initiate smoking cessation education as indicated  - Encourage broncho-pulmonary hygiene including cough, deep breathe, Incentive Spirometry  - Assess the need for suctioning and aspirate as needed  - Assess and instruct to report SOB or any respiratory difficulty  - Respiratory Therapy support as indicated  Outcome: Progressing     Problem: METABOLIC, FLUID AND ELECTROLYTES - ADULT  Goal: Electrolytes maintained within normal limits  Description: INTERVENTIONS:  - Monitor labs and assess patient for signs and symptoms of electrolyte imbalances  - Administer electrolyte replacement as ordered  - Monitor response to electrolyte replacements, including repeat lab results as appropriate  - Instruct patient on fluid and nutrition as appropriate  Outcome: Progressing  Goal: Fluid balance maintained  Description: INTERVENTIONS:  - Monitor labs   - Monitor I/O and WT  - Instruct patient on fluid and nutrition as appropriate  - Assess for signs & symptoms of volume excess or deficit  Outcome: Progressing

## 2023-06-08 NOTE — ASSESSMENT & PLAN NOTE
· Follows outpatient with oncology in [facility] where patient is from  · Continue home tacrolimus 1 mg every other day  · Continue outpatient follow-up with primary oncologist in [facility]

## 2023-06-11 LAB
BACTERIA BLD CULT: NORMAL
BACTERIA BLD CULT: NORMAL

## 2023-06-12 LAB
BACTERIA BLD CULT: NORMAL
BACTERIA BLD CULT: NORMAL

## 2023-08-06 PROBLEM — A41.9 SEPSIS (HCC): Status: RESOLVED | Noted: 2023-06-07 | Resolved: 2023-08-06
